# Patient Record
Sex: MALE | URBAN - METROPOLITAN AREA
[De-identification: names, ages, dates, MRNs, and addresses within clinical notes are randomized per-mention and may not be internally consistent; named-entity substitution may affect disease eponyms.]

---

## 2021-05-13 ENCOUNTER — APPOINTMENT (OUTPATIENT)
Age: 41
Setting detail: DERMATOLOGY
End: 2021-05-14

## 2021-05-13 VITALS — HEIGHT: 74 IN | WEIGHT: 290 LBS

## 2021-05-13 DIAGNOSIS — L29.8 OTHER PRURITUS: ICD-10-CM

## 2021-05-13 DIAGNOSIS — L20.89 OTHER ATOPIC DERMATITIS: ICD-10-CM

## 2021-05-13 PROBLEM — L30.9 DERMATITIS, UNSPECIFIED: Status: ACTIVE | Noted: 2021-05-13

## 2021-05-13 PROCEDURE — 99202 OFFICE O/P NEW SF 15 MIN: CPT | Mod: 25

## 2021-05-13 PROCEDURE — OTHER MIPS QUALITY: OTHER

## 2021-05-13 PROCEDURE — 11104 PUNCH BX SKIN SINGLE LESION: CPT

## 2021-05-13 PROCEDURE — 11105 PUNCH BX SKIN EA SEP/ADDL: CPT

## 2021-05-13 PROCEDURE — OTHER BIOPSY BY PUNCH METHOD: OTHER

## 2021-05-13 PROCEDURE — OTHER PRESCRIPTION: OTHER

## 2021-05-13 PROCEDURE — OTHER COUNSELING: OTHER

## 2021-05-13 RX ORDER — FEXOFENADINE 180 MG/1
1 TABLET, FILM COATED ORAL QAM
Qty: 30 | Refills: 1 | Status: ERX | COMMUNITY
Start: 2021-05-13

## 2021-05-13 RX ORDER — HYDROXYZINE HYDROCHLORIDE 25 MG/1
1 TABLET, FILM COATED ORAL QHS
Qty: 30 | Refills: 1 | Status: ERX | COMMUNITY
Start: 2021-05-13

## 2021-05-13 RX ORDER — CLOBETASOL PROPIONATE 0.5 MG/G
1 CREAM TOPICAL BID
Qty: 1 | Refills: 1 | Status: ERX | COMMUNITY
Start: 2021-05-13

## 2021-05-13 ASSESSMENT — LOCATION DETAILED DESCRIPTION DERM: LOCATION DETAILED: RIGHT DISTAL PRETIBIAL REGION

## 2021-05-13 ASSESSMENT — LOCATION SIMPLE DESCRIPTION DERM: LOCATION SIMPLE: RIGHT PRETIBIAL REGION

## 2021-05-13 ASSESSMENT — LOCATION ZONE DERM: LOCATION ZONE: LEG

## 2021-05-13 NOTE — PROCEDURE: BIOPSY BY PUNCH METHOD
Patient Will Remove Sutures At Home?: No
Additional Anesthesia Volume In Cc (Will Not Render If 0): 0
Billing Type: Third-Party Bill
Validate Note Data (See Information Below): Yes
Anesthesia Type: 1% lidocaine with epinephrine
Notification Instructions: Patient will be notified of biopsy results. However, patient instructed to call the office if not contacted within 2 weeks.
Post-Care Instructions: I reviewed with the patient in detail post-care instructions. Patient is to keep the biopsy site dry overnight, and then apply bacitracin twice daily until healed. Patient may apply hydrogen peroxide soaks to remove any crusting.
Anesthesia Volume In Cc (Will Not Render If 0): 0.5
Epidermal Sutures: 3-0 Ethilon
Wound Care: Petrolatum
Consent: Written consent was obtained and risks were reviewed including but not limited to scarring, infection, bleeding, scabbing, incomplete removal, nerve damage and allergy to anesthesia.
Detail Level: Detailed
Punch Size In Mm: 3
Biopsy Type: H and E
Home Suture Removal Text: Patient was provided a home suture removal kit and will remove their sutures at home.  If they have any questions or difficulties they will call the office.
Suture Removal: 14 days
Information: Selecting Yes will display possible errors in your note based on the variables you have selected. This validation is only offered as a suggestion for you. PLEASE NOTE THAT THE VALIDATION TEXT WILL BE REMOVED WHEN YOU FINALIZE YOUR NOTE. IF YOU WANT TO FAX A PRELIMINARY NOTE YOU WILL NEED TO TOGGLE THIS TO 'NO' IF YOU DO NOT WANT IT IN YOUR FAXED NOTE.
Dressing: bandage
Hemostasis: None

## 2021-05-28 ENCOUNTER — APPOINTMENT (OUTPATIENT)
Age: 41
Setting detail: DERMATOLOGY
End: 2021-05-28

## 2021-05-28 DIAGNOSIS — L29.8 OTHER PRURITUS: ICD-10-CM

## 2021-05-28 DIAGNOSIS — Z48.02 ENCOUNTER FOR REMOVAL OF SUTURES: ICD-10-CM

## 2021-05-28 DIAGNOSIS — L20.89 OTHER ATOPIC DERMATITIS: ICD-10-CM

## 2021-05-28 PROBLEM — L20.84 INTRINSIC (ALLERGIC) ECZEMA: Status: ACTIVE | Noted: 2021-05-28

## 2021-05-28 PROCEDURE — OTHER MIPS QUALITY: OTHER

## 2021-05-28 PROCEDURE — 99213 OFFICE O/P EST LOW 20 MIN: CPT

## 2021-05-28 PROCEDURE — OTHER SUTURE REMOVAL (GLOBAL PERIOD): OTHER

## 2021-05-28 PROCEDURE — OTHER ORDER TESTS: OTHER

## 2021-05-28 PROCEDURE — OTHER ADDITIONAL NOTES: OTHER

## 2021-05-28 PROCEDURE — OTHER COUNSELING: OTHER

## 2021-05-28 PROCEDURE — OTHER PRESCRIPTION MEDICATION MANAGEMENT: OTHER

## 2021-05-28 ASSESSMENT — LOCATION DETAILED DESCRIPTION DERM
LOCATION DETAILED: RIGHT PROXIMAL PRETIBIAL REGION
LOCATION DETAILED: RIGHT DISTAL PRETIBIAL REGION

## 2021-05-28 ASSESSMENT — LOCATION ZONE DERM: LOCATION ZONE: LEG

## 2021-05-28 ASSESSMENT — LOCATION SIMPLE DESCRIPTION DERM: LOCATION SIMPLE: RIGHT PRETIBIAL REGION

## 2021-05-28 NOTE — PROCEDURE: ADDITIONAL NOTES
Render Risk Assessment In Note?: no
Additional Notes: Due to biopsy showing potential connective tissue disease, will order autoimmune testing
Detail Level: Simple

## 2021-05-28 NOTE — PROCEDURE: SUTURE REMOVAL (GLOBAL PERIOD)
Detail Level: Detailed
Add 81118 Cpt? (Important Note: In 2017 The Use Of 54846 Is Being Tracked By Cms To Determine Future Global Period Reimbursement For Global Periods): no

## 2021-05-28 NOTE — PROCEDURE: PRESCRIPTION MEDICATION MANAGEMENT
Detail Level: Zone
Render In Strict Bullet Format?: No
Continue Regimen: Clobetasol and fexofenadine